# Patient Record
Sex: FEMALE | Race: WHITE
[De-identification: names, ages, dates, MRNs, and addresses within clinical notes are randomized per-mention and may not be internally consistent; named-entity substitution may affect disease eponyms.]

---

## 2017-08-02 ENCOUNTER — HOSPITAL ENCOUNTER (OUTPATIENT)
Dept: HOSPITAL 45 - C.MAMM | Age: 52
Discharge: HOME | End: 2017-08-02
Attending: OBSTETRICS & GYNECOLOGY
Payer: COMMERCIAL

## 2017-08-02 DIAGNOSIS — Z12.31: Primary | ICD-10-CM

## 2017-08-02 NOTE — MAMMOGRAPHY REPORT
BILATERAL DIGITAL SCREENING MAMMOGRAM WITH CAD: 8/2/2017

CLINICAL HISTORY: Routine screening.  Patient has no complaints.  





TECHNIQUE:  Bilateral CC and MLO views of the breasts with and without implant displaced views were o
btained.  Current study was also evaluated with a Computer Aided Detection (CAD) system.  



COMPARISON: Comparison is made to exams dated:  6/28/2016 mammogram, 6/26/2015 mammogram, 4/22/2014 m
ammogram, 8/16/2013 mammogram - [facility], 3/21/2013 mammogram, and 5/27/2009.   



BREAST COMPOSITION:  The tissue of both breasts is heterogeneously dense, which may obscure small mas
ses.  



FINDINGS:  There is stable nodularity in the superior right breast.  No suspicious spiculated or irre
gular mass, architectural distortion or cluster of suspicious microcalcifications is seen.  



IMPRESSION:  ACR BI-RADS CATEGORY 1: NEGATIVE

There is no mammographic evidence of malignancy. A 1 year screening mammogram is recommended.  The pa
tient will receive written notification of the results.  





Approximately 10% of breast cancers are not detected with mammography. A negative mammographic report
 should not delay biopsy if a clinically suggestive mass is present.



Natalee Galvez M.D.          

ay/:8/2/2017 13:24:13  



Imaging Technologist: Kyara SHAH(ZACHARIAH)(DAHLIA)(BD), [facility]

letter sent: Normal 1/2  

BI-RADS Code: ACR BI-RADS Category 1: Negative

## 2018-08-15 ENCOUNTER — HOSPITAL ENCOUNTER (OUTPATIENT)
Dept: HOSPITAL 45 - C.MAMM | Age: 53
Discharge: HOME | End: 2018-08-15
Attending: OBSTETRICS & GYNECOLOGY
Payer: COMMERCIAL

## 2018-08-15 DIAGNOSIS — C50.912: Primary | ICD-10-CM

## 2018-08-15 DIAGNOSIS — C77.9: ICD-10-CM

## 2018-08-15 NOTE — DISCHARGE INSTRUCTIONS
Discharge Instructions


Procedure


Procedure Date:


Aug 15, 2018.


Reason for visit:


Left Breast Mass/Implants.





Discharge


Discharge Date:


Aug 15, 2018.


Discharge Diagnosis:


status post breast biopsy





Instructions


Activity Recommendations:  Additional Limitations (see below)


Return to School/Work:  no limitations


Recommended Home Diet:  No Limitations


Provider Instructions:





ACTIVITY RECOMMENDATIONS:





*  No lifting, pushing, pulling or exercising the affected side for three days.








RETURN TO SCHOOL/WORK:





*  You may return to work/school after the procedure, but do not perform any 

strenuous


   activities for 24 to 48 hours.








MEDICATIONS:





*  Tylenol (two 325 mg) every four to six hours if needed for mild pain (if not 

allergic to Tylenol).








DIET:





*  Resume previous diet.








SPECIAL CARE INSTRUCTIONS:





*  Keep biopsy site dry for 24 hours.  May shower after 24 hours, but do not 

soak (bathe)


   incision.





*  May remove Tegaderm (plastic patch) 24 hours after procedure





*  Leave the steri-strips on for one week.  Allow the steri-strips to fall off 

by themselves.  


   If not off after one week, you may remove them.  You may place a Bandaid


   crosswise over the strips, if desired.





*  Apply ice 10 minutes on and 10 minutes off as needed.





*  Wear a bra at bedtime to sleep more comfortably for 2-3 days.





*  Your referring physician should have the results after approximately 5 to 7 

business days.





*  Call for unusual bleeding, fever, drainage, etc or if you have any questions 

call


    [phone] during normal business hours or after hours call Dr Barragan, [phone].








FOLLOW UP VISIT:





Follow-up with Referring Physician as scheduled.





Allergies


Uncoded Allergies:  


     NKDA (Allergy, Unknown, 6/3/03)





Nilda Arreola Recommendations:


 


Call your doctor if:





*  Temperature above 101 degrees


*  Pain not relieved by pain medicine ordered


*  There is increased drainage or redness from any incision


*  You have any unanswered questions or concerns.





Your Doctors Instructions noted above were prepared by provider Trinidad Barragan.


Patient Signature Section:





 Patient Instructions Signature Page














Kaila Browning 











Patient (or Guardian) Signature/Date:____________________________________ I 

have read and understand the instructions given to me by my caregivers.








Caregiver/RN/Doctor Signature/Date:____________________________________











The above-named patient and/or guardian has received patient instructions on 

this date.





























+  Original Patient Signature Page (only) stays with chart.  Please make copy 

for patient.

## 2018-08-15 NOTE — MAMMOGRAPHY REPORT
BILATERAL DIGITAL DIAGNOSTIC MAMMOGRAM TOMOSYNTHESIS WITH CAD AND LEFT ULTRASOUND: 8/15/2018

CLINICAL HISTORY: 52-year-old woman presents with a few week history of a palpable lump in the 12:00 
left breast. She also describes a previous palpable lump in the left axillary region that she noticed
 a few months ago but it went away. Also due for annual bilateral screening mammography.  





TECHNIQUE: Bilateral CC and MLO views of the breasts with and without implant displacement views were
 obtained.  Tomosynthesis was also performed on the implant displaced views.  Current study was also 
evaluated with a Computer Aided Detection (CAD) system.  



COMPARISON: Comparison is made to exams dated:  8/2/2017 mammogram, 6/28/2016 mammogram, 6/26/2015 ma
mmogram, 4/22/2014 mammogram, 8/16/2013 ultrasound, and 8/16/2013 mammogram - [facility].   

Ultrasound of was performed.  



BREAST COMPOSITION: The tissue of both breasts is heterogeneously dense, which may obscure small mass
es.  



FINDINGS: Bilateral subpectoral saline implants are intact.  A triangle palpable marker overlies the 
12:00 to 1:00 left breast, denoting the palpable lump pointed out by the patient.  Mammographically, 
best appreciated on the implant displaced left CC view is an irregular and spiculated mass measuring 
approximately 3.0 x 2.5 x 2.6 cm.  No other obvious mass or other area of architectural distortion is
 identified in the left breast.  



There is stable nodularity of the right breast best appreciated on the implant displaced right CC vie
w.  No obvious new mass, asymmetry, area of architectural distortion or suspicious calcifications are
 identified in the right breast.



The patient pointed out the palpable lump in the 12:00 left breast, 4 cm from the nipple.  On palpati
on, there is a discrete firm 2 cm mass.  Targeted ultrasound performed directly over this mass demons
trates an irregular and spiculated hypoechoic solid mass measuring 1.7 x 1.9 x 2.5 cm. These measurem
ents differ slightly from the mammographic measurements and unsure which measurements are more accura
te.  Internal vascularity and possible few internal calcifications are identified.  This mass is susp
icious for malignancy and definitive characterization with an ultrasound-guided core biopsy is recomm
ended.  There are incidentally noted anechoic cysts near this suspicious solid mass, one in the 11:00
 axis measures 0.6 cm and 3 additional cysts in the 1:00 left breast measure 0.7, 0.9 and 0.4 cm.



Additional targeted ultrasound was performed in the left axilla.  There are 2 adjacent enlarged, morp
hologically abnormal lymph nodes in the inferior left axilla.  The first lymph node measures 1.2 x 1.
1 cm and the second measures 1.0 x 0.6 cm.  These lymph nodes are suspicious for metastatic disease a
nd ultrasound-guided core biopsy of the larger lymph node is also recommended.



IMPRESSION: ACR BI-RADS CATEGORY 5: HIGHLY SUGGESTIVE OF MALIGNANCY, ULTRASOUND ACR BI-RADS CATEGORY 
5: HIGHLY SUGGESTIVE OF MALIGNANCY 

1.  The palpable lump in the 12:00 left breast correlates with a suspicious spiculated and irregular 
solid mass measuring 2.5 cm on ultrasound but up to 3.0 cm mammographically.  Definitive characteriza
tion with an ultrasound-guided core needle biopsy is recommended.

2.  There are morphologically abnormal lymph nodes in the left axilla concerning for metastatic disea
se.  Ultrasound guided core biopsy of the larger, 1.2 cm left axillary lymph node is also recommended
.

3.  No definite mammographic evidence of malignancy in the right breast, however, given the dense estefany
asts and personal history of breast implants, if pathology results from the left breast biopsy are ma
lignant, then a breast MRI is recommended prior to definitive treatment.



The mammogram and ultrasound results as well as recommendation for biopsy were discussed with the pat
ient at the time of the exam.  She plans to return to the department for left breast and axillary bio
psies later this afternoon.





Some breast cancers are not detected with mammography. A negative mammographic report should not andrew
y biopsy if a clinically suggestive mass is present.



Natalee Galvez M.D.          

ay/:8/15/2018 12:03:44  



Imaging Technologist: Adriana Barclay, [facility]; Natalee Galvez, [facility]

letter sent: Abnormal 4/5  

OVERALL STUDY BIRADS: 5 Highly suggestive of malignancy

## 2018-08-16 NOTE — MAMMOGRAPHY REPORT
ULTRASOUND GUIDED BIOPSY LEFT BREAST: 8/15/2018

CLINICAL HISTORY: Suspicious mass in the left 12:00 breast.  



PATIENT CONSENT: The procedure, risks and benefits were discussed with the patient and informed writt
en consent was obtained. A timeout was performed immediately prior to the procedure.    



PROCEDURE DESCRIPTION: With ultrasound guidance, aseptic technique, and lidocaine as the local anesth
etic (1% lidocaine to anesthetize the skin and 1% lidocaine with epinephrine to anesthetize the deepe
r tissues), the mass of concern in the left 12:00 breast was sampled 5 times with a 14-gauge Achieve 
biopsy needle.  Immediately thereafter, with ultrasound guidance,  a metallic localizer clip was plac
ed at the biopsy site.  Direct pressure was applied to the site immediately post procedure until hemo
stasis was achieved.  Postprocedure unilateral mammograms were performed to confirm clip placement; p
ostprocedural left CC and MLO views show a new ribbon-shaped biopsy marker clip within the biopsied m
ass in the left 12:00 breast.  A biopsy clip is seen within the biopsy left axillary lymph node on th
e MLO view.  Steri-Strips were placed over the site and covered with an Opsite patch. The patient erika
erated the procedure without complication.  She was given wound care instructions. The specimens were
 sent to pathology for analysis.  





COMPARISON: Comparison is made to exams dated:  8/15/2018 mammogram, 8/15/2018 ultrasound, 8/2/2017 m
ammogram, 6/28/2016 mammogram, 6/26/2015 mammogram, and 4/22/2014 mammogram - [facility].   





IMPRESSION: ULTRASOUND GUIDED BIOPSY

Ultrasound-guided core needle biopsy of the mass in the left 12:00 breast, with clip placement.  The 
patient will receive pathology results from her referring provider.



Trinidad Barragan M.D.  

/:8/15/2018 15:20:31  



Imaging Technologist: Adriana Barclay, [facility]

## 2018-08-16 NOTE — MAMMOGRAPHY REPORT
ULTRASOUND GUIDED BIOPSY LEFT BREAST: 8/15/2018

CLINICAL HISTORY: Abnormal left axillary lymph nodes.  



PATIENT CONSENT: The procedure, risks and benefits were discussed with the patient and informed writt
en consent was obtained. A timeout was performed immediately prior to the procedure.    



PROCEDURE DESCRIPTION: With ultrasound guidance, aseptic technique, and lidocaine as the local anesth
etic (1% lidocaine to anesthetize the skin and 1% lidocaine with epinephrine to anesthetize the deepe
r tissues), one of the abnormal left axillary lymph nodes was sampled 4 times with a 14-gauge Achieve
 biopsy needle.  Immediately thereafter, with ultrasound guidance,  a metallic localizer clip was elder
viviana at the biopsy site.  Direct pressure was applied to the site immediately post procedure until hem
ostasis was achieved.  



Postprocedure unilateral mammograms were performed to confirm clip placement; postprocedural left CC 
and MLO views show a new ribbon-shaped biopsy marker clip within the biopsied mass in the left 12:00 
breast.  A biopsy clip is seen within the biopsy left axillary lymph node on the MLO view.   Steri-St
rips were placed over the site and covered with an Opsite patch. The patient tolerated the procedure 
without complication.  She was given wound care instructions. The specimens were sent to pathology fo
r analysis.  





COMPARISON: Comparison is made to exams dated:  8/15/2018 mammogram, 8/15/2018 ultrasound, 8/2/2017 m
ammogram, 6/28/2016 mammogram, 6/26/2015 mammogram, and 4/22/2014 mammogram - [facility].   





IMPRESSION: ULTRASOUND GUIDED BIOPSY

Ultrasound-guided core needle biopsy of one of the abnormal left axillary lymph nodes, with clip plac
ement.  The patient will receive pathology results from her referring provider.





Trinidad Barragan M.D.  

/:8/15/2018 15:21:20  



Imaging Technologist: Natalee Galvez, [facility]